# Patient Record
Sex: FEMALE | Race: BLACK OR AFRICAN AMERICAN | NOT HISPANIC OR LATINO | URBAN - METROPOLITAN AREA
[De-identification: names, ages, dates, MRNs, and addresses within clinical notes are randomized per-mention and may not be internally consistent; named-entity substitution may affect disease eponyms.]

---

## 2018-04-02 ENCOUNTER — EMERGENCY (EMERGENCY)
Facility: HOSPITAL | Age: 53
LOS: 1 days | Discharge: HOME | End: 2018-04-02
Attending: EMERGENCY MEDICINE | Admitting: EMERGENCY MEDICINE
Payer: MEDICAID

## 2018-04-02 VITALS
HEART RATE: 98 BPM | TEMPERATURE: 98 F | OXYGEN SATURATION: 100 % | RESPIRATION RATE: 18 BRPM | DIASTOLIC BLOOD PRESSURE: 90 MMHG | SYSTOLIC BLOOD PRESSURE: 166 MMHG

## 2018-04-02 VITALS
HEART RATE: 111 BPM | RESPIRATION RATE: 18 BRPM | SYSTOLIC BLOOD PRESSURE: 195 MMHG | DIASTOLIC BLOOD PRESSURE: 115 MMHG | WEIGHT: 165.35 LBS | OXYGEN SATURATION: 100 % | TEMPERATURE: 98 F

## 2018-04-02 DIAGNOSIS — M25.551 PAIN IN RIGHT HIP: ICD-10-CM
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
## 2018-04-02 DIAGNOSIS — Z88.0 ALLERGY STATUS TO PENICILLIN: ICD-10-CM
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
## 2018-04-02 DIAGNOSIS — M25.521 PAIN IN RIGHT ELBOW: ICD-10-CM
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
## 2018-04-02 DIAGNOSIS — Z98.890 OTHER SPECIFIED POSTPROCEDURAL STATES: Chronic | ICD-10-CM

## 2018-04-02 PROCEDURE — 73070 X-RAY EXAM OF ELBOW: CPT | Mod: 26,RT

## 2018-04-02 PROCEDURE — 99053 MED SERV 10PM-8AM 24 HR FAC: CPT

## 2018-04-02 PROCEDURE — 99284 EMERGENCY DEPT VISIT MOD MDM: CPT | Mod: 25

## 2018-04-02 PROCEDURE — 99283 EMERGENCY DEPT VISIT LOW MDM: CPT | Mod: 25

## 2018-04-02 PROCEDURE — 73502 X-RAY EXAM HIP UNI 2-3 VIEWS: CPT | Mod: 26,RT

## 2018-04-02 PROCEDURE — 73502 X-RAY EXAM HIP UNI 2-3 VIEWS: CPT

## 2018-04-02 PROCEDURE — 73070 X-RAY EXAM OF ELBOW: CPT

## 2018-04-02 RX ORDER — IBUPROFEN 200 MG
800 TABLET ORAL ONCE
Qty: 0 | Refills: 0 | Status: COMPLETED | OUTPATIENT
Start: 2018-04-02 | End: 2018-04-02

## 2018-04-02 RX ORDER — ACETAMINOPHEN 500 MG
975 TABLET ORAL ONCE
Qty: 0 | Refills: 0 | Status: COMPLETED | OUTPATIENT
Start: 2018-04-02 | End: 2018-04-02

## 2018-04-02 RX ADMIN — Medication 975 MILLIGRAM(S): at 08:07

## 2018-04-02 RX ADMIN — Medication 800 MILLIGRAM(S): at 08:07

## 2018-04-02 RX ADMIN — Medication 800 MILLIGRAM(S): at 07:12

## 2018-04-02 RX ADMIN — Medication 975 MILLIGRAM(S): at 07:12

## 2018-04-02 NOTE — ED PROVIDER NOTE - PHYSICAL EXAMINATION
VITAL SIGNS: I have reviewed nursing notes and confirm.  CONSTITUTIONAL: Well-developed; well-nourished female comfortable in stretcher, seen ambulating in ED w/out difficulty; in no acute distress.  SKIN: Agree with RN documentation regarding decubitus evaluation. Remainder of skin exam is warm and dry, no acute rash.  HEAD: Normocephalic; atraumatic.  EYES: PERRL, EOM intact; conjunctiva and sclera clear.  ENT: No nasal discharge; airway clear.  NECK: Supple; non tender.  CARD: S1, S2 normal; no murmurs, gallops, or rubs. Regular rate and rhythm.  RESP: No wheezes, rales or rhonchi. CTA w/good excursion, no inc wob  ABD: Normal bowel sounds; soft; non-distended; non-tender  EXT: Normal ROM all ext, + TTP R elbow w/out discoloration or effusion, + R lateral hip TTP w/out swelling/ecchymoses, distal pulses intact  LYMPH: No acute cervical adenopathy.  NEURO: Alert, oriented. Grossly unremarkable.  PSYCH: Cooperative, appropriate.

## 2018-04-02 NOTE — ED PROVIDER NOTE - CARE PLAN
Principal Discharge DX:	Fall (on)(from) sidewalk curb, initial encounter  Secondary Diagnosis:	Hip pain, acute, right

## 2018-04-02 NOTE — ED ADULT TRIAGE NOTE - ARRIVAL INFO ADDITIONAL COMMENTS
pt slipped on street and fell.  c/o pain in right elbow and right buttock.  no loc.  full wt bear.  painful rom of right arm

## 2018-04-02 NOTE — ED PROVIDER NOTE - OBJECTIVE STATEMENT
51 y/o F w/no sig pmhx (denies HTN despite vitals), had mechanical fall today in the snow while crossing the street, falling onto her R side injuring her R hip and R elbow. Did not hit her head. No LOC. No HA or visual sx. Remembers event. Was able to ambulate after fall but endorsing R hip pain. No CP/SOB/palpitations. No n/v.

## 2018-04-02 NOTE — ED ADULT NURSE NOTE - OBJECTIVE STATEMENT
pt received into spot [facility] A&Ox3 ambulatory with steady gait arrives via walk in triage s/p mechanical slip and fall in the snow on the corner just prior to arrival. Fell onto her right side complaining of pain to right elbow pain 5/10 no obvious trauma/ injury +radial pulse pain worse with movement. Also with right back/ buttock pain, and right sided neck pain difficulty turning her head side to side due to pain. Pt denies head trauma or LOC, no CP SOB N/V abd pain urinary symptoms. Denies medical hx daily medications. Awaiting Md ansari will monitor and reassess. pt in NAD

## 2018-04-02 NOTE — ED PROVIDER NOTE - MEDICAL DECISION MAKING DETAILS
Negative imaging for acute fx, pain improved with nsaids/tylenol, ambulatory and safe to d/c home with return precautions and continued nsaids for pain control at home for MSK injury 2/2 mechanical fall.
